# Patient Record
Sex: FEMALE | Race: WHITE | Employment: FULL TIME | URBAN - METROPOLITAN AREA
[De-identification: names, ages, dates, MRNs, and addresses within clinical notes are randomized per-mention and may not be internally consistent; named-entity substitution may affect disease eponyms.]

---

## 2018-11-02 ENCOUNTER — OFFICE VISIT (OUTPATIENT)
Dept: NEUROLOGY | Age: 53
End: 2018-11-02

## 2018-11-02 VITALS
DIASTOLIC BLOOD PRESSURE: 80 MMHG | HEART RATE: 60 BPM | BODY MASS INDEX: 22.82 KG/M2 | HEIGHT: 66 IN | SYSTOLIC BLOOD PRESSURE: 118 MMHG | OXYGEN SATURATION: 97 % | WEIGHT: 142 LBS

## 2018-11-02 DIAGNOSIS — R25.1 TREMOR: ICD-10-CM

## 2018-11-02 DIAGNOSIS — F41.9 ANXIETY: Primary | ICD-10-CM

## 2018-11-02 RX ORDER — MINERAL OIL
180 ENEMA (ML) RECTAL DAILY
COMMUNITY

## 2018-11-02 RX ORDER — LORAZEPAM 0.5 MG/1
TABLET ORAL
Qty: 20 TAB | Refills: 0 | Status: SHIPPED | OUTPATIENT
Start: 2018-11-02

## 2018-11-02 NOTE — PATIENT INSTRUCTIONS
Office Policies 
o Phone calls/patient messages: Please allow up to 24 hours for someone in the office to contact you about your call or message. Be mindful your provider may be out of the office or your message may require further review. We encourage you to use [employer] for your messages as this is a faster, more efficient way to communicate with our officeo Medication Refills: 
Prescription medications require up to 48 business hours to process. We encourage you to use [employer] for your refills. For controlled medications: Please allow up to 72 business hours to process. Certain medications may require you to  a written prescription at our office. NO narcotic/controlled medications will be prescribed after 4pm Monday through Friday or on weekendso Form/Paperwork Completion: 
Please note there is a $25 fee for all paperwork completed by our providers. We ask that you allow 7-14 business days. Pre-payment is due prior to picking up/faxing the completed form. You may also download your forms to [employer] to have your doctor print off. 
o Test Results: In order for a patient to obtain test results, an appointment must be made with the physician to review the results. Test results cannot be discussed over the phone. A Healthy Lifestyle: Care Instructions Your Care Instructions A healthy lifestyle can help you feel good, stay at a healthy weight, and have plenty of energy for both work and play. A healthy lifestyle is something you can share with your whole family. A healthy lifestyle also can lower your risk for serious health problems, such as high blood pressure, heart disease, and diabetes. You can follow a few steps listed below to improve your health and the health of your family. Follow-up care is a key part of your treatment and safety.  Be sure to make and go to all appointments, and call your doctor if you are having problems. It's also a good idea to know your test results and keep a list of the medicines you take. How can you care for yourself at home? · Do not eat too much sugar, fat, or fast foods. You can still have dessert and treats now and then. The goal is moderation. · Start small to improve your eating habits. Pay attention to portion sizes, drink less juice and soda pop, and eat more fruits and vegetables. ? Eat a healthy amount of food. A 3-ounce serving of meat, for example, is about the size of a deck of cards. Fill the rest of your plate with vegetables and whole grains. ? Limit the amount of soda and sports drinks you have every day. Drink more water when you are thirsty. ? Eat at least 5 servings of fruits and vegetables every day. It may seem like a lot, but it is not hard to reach this goal. A serving or helping is 1 piece of fruit, 1 cup of vegetables, or 2 cups of leafy, raw vegetables. Have an apple or some carrot sticks as an afternoon snack instead of a candy bar. Try to have fruits and/or vegetables at every meal. 
· Make exercise part of your daily routine. You may want to start with simple activities, such as walking, bicycling, or slow swimming. Try to be active 30 to 60 minutes every day. You do not need to do all 30 to 60 minutes all at once. For example, you can exercise 3 times a day for 10 or 20 minutes. Moderate exercise is safe for most people, but it is always a good idea to talk to your doctor before starting an exercise program. 
· Keep moving. Jereld Mcardle the lawn, work in the garden, or [employer]. Take the stairs instead of the elevator at work. · If you smoke, quit. People who smoke have an increased risk for heart attack, stroke, cancer, and other lung illnesses. Quitting is hard, but there are ways to boost your chance of quitting tobacco for good. ? Use nicotine gum, patches, or lozenges. ? Ask your doctor about stop-smoking programs and medicines. ? Keep trying. In addition to reducing your risk of diseases in the future, you will notice some benefits soon after you stop using tobacco. If you have shortness of breath or asthma symptoms, they will likely get better within a few weeks after you quit. · Limit how much alcohol you drink. Moderate amounts of alcohol (up to 2 drinks a day for men, 1 drink a day for women) are okay. But drinking too much can lead to liver problems, high blood pressure, and other health problems. Family health If you have a family, there are many things you can do together to improve your health. · Eat meals together as a family as often as possible. · Eat healthy foods. This includes fruits, vegetables, lean meats and dairy, and whole grains. · Include your family in your fitness plan. Most people think of activities such as jogging or tennis as the way to fitness, but there are many ways you and your family can be more active. Anything that makes you breathe hard and gets your heart pumping is exercise. Here are some tips: 
? Walk to do errands or to take your child to school or the bus. 
? Go for a family bike ride after dinner instead of watching TV. Where can you learn more? Go to http://real-arely.info/. Enter V076 in the search box to learn more about \"A Healthy Lifestyle: Care Instructions. \" Current as of: December 7, 2017 Content Version: 11.8 © 0468-0942 [employer]. Care instructions adapted under license by [employer] (which disclaims liability or warranty for this information). If you have questions about a medical condition or this instruction, always ask your healthcare professional. [place] [address] any warranty or liability for your use of this information.

## 2018-11-02 NOTE — PROGRESS NOTES
HISTORY OF PRESENT ILLNESS Maria Eugenia Barakat is a 45 y.o. female. This patient is a 68-year-old right-handed woman who comes in today for head and hand tremors. She states that she has noticed that her head will begin a side to side movement and she will get a similar tremor in her hands. It is more prominent when she is using her hands. Sitting and eating at a table with silverware often brings it out the most.  She states that it does not really interfere with what she does but it is rather embarrassing for her to have to put up with it. She has a long-standing anxiety disorder for which she was put on Paxil CR 25 mg 1 a day. She takes trazodone 25 mg nightly for sleep. She also has a history of renal disease is only moderately progressed. She has noticed no problems walking or keeping her balance. She has no family history of movement disorder or Parkinson's disease. Review of Systems Constitutional:  
     Review of systems is positive for anxiety, constipation, joint pain, memory loss, muscle weakness, tinnitus, occasional skipped beats and occasional blurred vision. Complete review of systems done all others negative Current Outpatient Medications on File Prior to Visit Medication Sig Dispense Refill  trazodone HCl (TRAZODONE PO) Take 25 mg by mouth.  fexofenadine (ALLEGRA) 180 mg tablet Take 180 mg by mouth daily.  levothyroxine (SYNTHROID) 50 mcg tablet Take  by mouth Daily (before breakfast).  PARoxetine CR (PAXIL CR) 25 mg tablet Take 25 mg by mouth daily.  pravastatin (PRAVACHOL) 20 mg tablet Take 20 mg by mouth nightly. No current facility-administered medications on file prior to visit. Past Medical History:  
Diagnosis Date  Anxiety disorder  Diabetes ([facility] [address] borderline  Ill-defined condition   
 elevated chol  Kidney disease  Thyroid disease Family History Problem Relation Age of Onset  Diabetes Father  Heart Disease Father Social History Tobacco Use  Smoking status: Never Smoker  Smokeless tobacco: Never Used Substance Use Topics  Alcohol use: Yes Alcohol/week: 0.6 oz Types: 1 Cans of beer per week  Drug use: No  
 
/80   Pulse 60   Ht 5' 6\" (1.676 m)   Wt 64.4 kg (142 lb)   SpO2 97%   BMI 22.92 kg/m² Physical Exam 
Constitutional: Oriented to person, place, and time, appears well-developed and well-nourished. No distress. HENT:  
Head: Normocephalic and atraumatic. Mouth/Throat: Oropharynx is clear and moist. No oropharyngeal exudate. Eyes: Conjunctivae and EOM are normal. Pupils are equal, round, and reactive to light. No scleral icterus. Neck: Normal range of motion. Neck supple. No thyromegaly present. Cardiovascular: Normal rate, regular rhythm and normal heart sounds. Musculoskeletal: Normal range of motion, exhibits no edema, tenderness or deformity. Lymphadenopathy: no cervical adenopathy. Neurological: Alert and oriented to person, place, and time. Normal strength and normal reflexes. Displays no atrophy,She has a fine 2-3 Hz tremor most prominent in her hands and her head. .  
No cranial nerve deficit or sensory deficit. Exhibits normal muscle tone. Displays a negative Romberg sign, no seizure activity. Coordination normal, gait normal.  
No Babinski's sign on the right side. No Babinski's sign on the left side. Speech, language and mentation are normal 
Visual fields are full to confrontation, funduscopic exam reveals flat discs, the retina and vasculature are normal  
Skin: Skin is warm and dry. No rash noted, not diaphoretic. No erythema. Psychiatric: Normal mood and affect,  behavior is normal. Judgment and thought content normal.  
Vitals reviewed. ASSESSMENT and PLAN 
ESSENTIAL TREMOR This patient is not interested in medication to take on a daily basis to cut down the tremor, she would rather on a as needed basis for social occasions. I will prescribe her some lorazepam 0.5 mg tablets she can take one half or 130 minutes prior to her engagement, this should cut down on the amount of tremor if she has for that short window of time. I explained to her that she cannot take these every day and I will not prescribe enough for her to take every day when they will rapidly lose their effectiveness and she will become anxious when she stops them. ANXIETY DISORDER Currently managed on trazodone 25 mg nightly. HYPERLIPIDEMIA Stroke risk, stable, managed by primary care. This note will not be viewable in [address]

## 2023-02-19 ENCOUNTER — HOSPITAL ENCOUNTER (EMERGENCY)
Age: 58
Discharge: HOME | End: 2023-02-19
Attending: STUDENT IN AN ORGANIZED HEALTH CARE EDUCATION/TRAINING PROGRAM
Payer: COMMERCIAL

## 2023-02-19 VITALS
OXYGEN SATURATION: 97 % | WEIGHT: 142 LBS | TEMPERATURE: 97.6 F | SYSTOLIC BLOOD PRESSURE: 107 MMHG | BODY MASS INDEX: 22.82 KG/M2 | HEART RATE: 61 BPM | HEIGHT: 66 IN | DIASTOLIC BLOOD PRESSURE: 50 MMHG | RESPIRATION RATE: 9 BRPM

## 2023-02-19 DIAGNOSIS — R55 SYNCOPE AND COLLAPSE: Primary | ICD-10-CM

## 2023-02-19 LAB
ALBUMIN SERPL-MCNC: 3.9 G/DL (ref 3.5–5)
ALBUMIN/GLOB SERPL: 1.1 (ref 1.1–2.2)
ALP SERPL-CCNC: 76 U/L (ref 45–117)
ALT SERPL-CCNC: 33 U/L (ref 12–78)
ANION GAP SERPL CALC-SCNC: 10 MMOL/L (ref 5–15)
AST SERPL-CCNC: 20 U/L (ref 15–37)
BASOPHILS # BLD: 0 K/UL (ref 0–0.1)
BASOPHILS NFR BLD: 1 % (ref 0–1)
BILIRUB SERPL-MCNC: 0.4 MG/DL (ref 0.2–1)
BUN SERPL-MCNC: 25 MG/DL (ref 6–20)
BUN/CREAT SERPL: 20 (ref 12–20)
CALCIUM SERPL-MCNC: 9 MG/DL (ref 8.5–10.1)
CHLORIDE SERPL-SCNC: 100 MMOL/L (ref 97–108)
CO2 SERPL-SCNC: 28 MMOL/L (ref 21–32)
COMMENT, HOLDF: NORMAL
CREAT SERPL-MCNC: 1.26 MG/DL (ref 0.55–1.02)
D DIMER PPP FEU-MCNC: <0.19 MG/L FEU (ref 0–0.65)
DIFFERENTIAL METHOD BLD: NORMAL
EOSINOPHIL # BLD: 0.1 K/UL (ref 0–0.4)
EOSINOPHIL NFR BLD: 3 % (ref 0–7)
ERYTHROCYTE [DISTWIDTH] IN BLOOD BY AUTOMATED COUNT: 12.8 % (ref 11.5–14.5)
GLOBULIN SER CALC-MCNC: 3.6 G/DL (ref 2–4)
GLUCOSE SERPL-MCNC: 125 MG/DL (ref 65–100)
HCT VFR BLD AUTO: 41.8 % (ref 35–47)
HGB BLD-MCNC: 13.9 G/DL (ref 11.5–16)
IMM GRANULOCYTES # BLD AUTO: 0 K/UL (ref 0–0.04)
IMM GRANULOCYTES NFR BLD AUTO: 0 % (ref 0–0.5)
LYMPHOCYTES # BLD: 2.2 K/UL (ref 0.8–3.5)
LYMPHOCYTES NFR BLD: 38 % (ref 12–49)
MCH RBC QN AUTO: 29 PG (ref 26–34)
MCHC RBC AUTO-ENTMCNC: 33.3 G/DL (ref 30–36.5)
MCV RBC AUTO: 87.1 FL (ref 80–99)
MONOCYTES # BLD: 0.3 K/UL (ref 0–1)
MONOCYTES NFR BLD: 6 % (ref 5–13)
NEUTS SEG # BLD: 2.9 K/UL (ref 1.8–8)
NEUTS SEG NFR BLD: 52 % (ref 32–75)
NRBC # BLD: 0 K/UL (ref 0–0.01)
NRBC BLD-RTO: 0 PER 100 WBC
PLATELET # BLD AUTO: 208 K/UL (ref 150–400)
PMV BLD AUTO: 9.3 FL (ref 8.9–12.9)
POTASSIUM SERPL-SCNC: 3.8 MMOL/L (ref 3.5–5.1)
PROT SERPL-MCNC: 7.5 G/DL (ref 6.4–8.2)
RBC # BLD AUTO: 4.8 M/UL (ref 3.8–5.2)
SAMPLES BEING HELD,HOLD: NORMAL
SODIUM SERPL-SCNC: 138 MMOL/L (ref 136–145)
TROPONIN I SERPL HS-MCNC: 21 NG/L (ref 0–51)
TROPONIN I SERPL HS-MCNC: 22 NG/L (ref 0–51)
WBC # BLD AUTO: 5.6 K/UL (ref 3.6–11)

## 2023-02-19 PROCEDURE — 36415 COLL VENOUS BLD VENIPUNCTURE: CPT

## 2023-02-19 PROCEDURE — 80053 COMPREHEN METABOLIC PANEL: CPT

## 2023-02-19 PROCEDURE — 99284 EMERGENCY DEPT VISIT MOD MDM: CPT

## 2023-02-19 PROCEDURE — 84484 ASSAY OF TROPONIN QUANT: CPT

## 2023-02-19 PROCEDURE — 85379 FIBRIN DEGRADATION QUANT: CPT

## 2023-02-19 PROCEDURE — 85025 COMPLETE CBC W/AUTO DIFF WBC: CPT

## 2023-02-19 PROCEDURE — 74011250636 HC RX REV CODE- 250/636: Performed by: STUDENT IN AN ORGANIZED HEALTH CARE EDUCATION/TRAINING PROGRAM

## 2023-02-19 PROCEDURE — 96360 HYDRATION IV INFUSION INIT: CPT

## 2023-02-19 PROCEDURE — 93005 ELECTROCARDIOGRAM TRACING: CPT

## 2023-02-19 RX ORDER — ONDANSETRON 2 MG/ML
4 INJECTION INTRAMUSCULAR; INTRAVENOUS
Status: DISCONTINUED | OUTPATIENT
Start: 2023-02-19 | End: 2023-02-19 | Stop reason: HOSPADM

## 2023-02-19 RX ADMIN — SODIUM CHLORIDE 1000 ML: 9 INJECTION, SOLUTION INTRAVENOUS at 13:31

## 2023-02-19 NOTE — Clinical Note
[address]  [facility] EMERGENCY DEPT  [address] [facility] [address]  [phone]    Work/School Note    Date: 2/19/2023    To Whom It May concern:    Murphy Wade was seen and treated today in the emergency room by the following provider(s):  Attending Provider: Yves Chapman MD.      Murphy Wade is excused from work/school on 02/19/23 and 02/20/23. She is medically clear to return to work/school on 2/21/2023.        Sincerely,          Brooklynn North MD

## 2023-02-19 NOTE — DISCHARGE INSTRUCTIONS
You were seen and evaluated today in the ER after passing out episode, this is called syncope, likely from low blood pressure and dehydration, if you continue to have symptoms or chest pain or shortness of breath please return to ER otherwise you can follow-up with cardiology this Thursday at your regular scheduled appointment.

## 2023-02-19 NOTE — ED PROVIDER NOTES
[facility] EMERGENCY DEPT  EMERGENCY DEPARTMENT ENCOUNTER       Pt Name: Liat Dobbins  MRN: [id number]  [place] 1965  Date of evaluation: 2/19/2023  Provider: Sivan Markham MD   PCP: Rachael Zuñiga MD  Note Started: 2:00 PM 2/19/23     CHIEF COMPLAINT       Chief Complaint   Patient presents with    Syncope        HISTORY OF PRESENT ILLNESS: 1 or more elements    History From: Patient  HPI Limitations : None     Liat Dobbins is a 59 y.o. female with Pmhx listed below     Patient is a 47-year-old female with history of thyroid disease, CKD, diabetes presenting with concern of syncopal episode which occurred today while eating. Patient states that she was eating lunch, states that she felt nauseous sick to her stomach and went out to her car, began feeling dizzy and then reportedly passed out, notified by bystanders, states that she had shaking all over but no bowel or bladder incontinence or tongue biting, states that she came back in seconds and states that she was not confused afterwards, denies any history of the same except for many years ago with a similar occurrence, states that she has had no recent vomiting, diarrhea. Patient states that since the event she has had no more lightheadedness or nausea, states that she otherwise has been in her usual state of health, no cardiac history but does state that she has a appointment upcoming with cardiology this Thursday, positive family history of coagulopathy but no personal, no leg swelling, no other complaints this time. Nursing Notes were all reviewed and agreed with or any disagreements were addressed in the HPI. REVIEW OF SYSTEMS      Positives and Pertinent negatives as per HPI.     PAST HISTORY     Past Medical History:  Past Medical History:   Diagnosis Date    Anxiety disorder     Diabetes ([facility] [address]     borderline    Ill-defined condition     elevated chol    Kidney disease     Thyroid disease      Previous Medications    FEXOFENADINE (ALLEGRA) 180 MG TABLET    Take 180 mg by mouth daily. LEVOTHYROXINE (SYNTHROID) 50 MCG TABLET    Take  by mouth Daily (before breakfast). LORAZEPAM (ATIVAN) 0.5 MG TABLET    1/2 or 1 po q 4 hours PRN tremor    PAROXETINE CR (PAXIL CR) 25 MG TABLET    Take 25 mg by mouth daily. PRAVASTATIN (PRAVACHOL) 20 MG TABLET    Take 20 mg by mouth nightly. TRAZODONE HCL (TRAZODONE PO)    Take 25 mg by mouth. Past Surgical History:  Past Surgical History:   Procedure Laterality Date    COLONOSCOPY,DIAGNOSTIC  4/11/2016         HX OTHER SURGICAL      cholecystectomy       Family History:  Family History   Problem Relation Age of Onset    Diabetes Father     Heart Disease Father        Social History:  Social History     Tobacco Use    Smoking status: Never    Smokeless tobacco: Never   Substance Use Topics    Alcohol use: Yes     Alcohol/week: 1.0 standard drink     Types: 1 Cans of beer per week    Drug use: No       Allergies: Allergies   Allergen Reactions    Cephalexin Hives    Erythromycin Nausea Only    Macrobid [Nitrofurantoin Monohyd/M-Cryst] Hives    Penicillins Hives    Sulfa (Sulfonamide Antibiotics) Hives    Vancomycin Hives       PHYSICAL EXAM      ED Triage Vitals [02/19/23 1156]   ED Encounter Vitals Group      BP (!) 103/54      Pulse (Heart Rate) 68      Resp Rate 18      Temp 97.6 °F (36.4 °C)      Temp src       O2 Sat (%) 97 %      Weight 142 lb      Height 5' 6\"        Physical Exam  Vitals reviewed. Constitutional:       General: She is not in acute distress. Appearance: Normal appearance. She is not ill-appearing, toxic-appearing or diaphoretic. HENT:      Head: Normocephalic. Mouth/Throat:      Mouth: Mucous membranes are moist.   Eyes:      Conjunctiva/sclera: Conjunctivae normal.   Cardiovascular:      Rate and Rhythm: Normal rate. Pulmonary:      Effort: Pulmonary effort is normal. No respiratory distress. Abdominal:      General: Abdomen is flat.    Musculoskeletal: General: No deformity. Cervical back: Neck supple. Right lower leg: No edema. Left lower leg: No edema. Skin:     General: Skin is warm and dry. Neurological:      General: No focal deficit present. Mental Status: She is alert.    Psychiatric:         Mood and Affect: Mood normal.        EMERGENCY DEPARTMENT COURSE and DIFFERENTIAL DIAGNOSIS/MDM   CC/HPI Summary, DDx, ED Course, and Reassessment:     MDM  Number of Diagnoses or Management Options  Syncope and collapse  Diagnosis management comments: Patient well-appearing 61-year-old female presenting with concern of syncopal episode which occurred earlier today, states that she felt nauseous, had 1 episode of vomiting and then felt dizzy afterwards, patient states that she had episode of shaking all over per bystanders but then returned to baseline quickly and had resolution of symptoms afterwards, patient denies any recent complaints, no recent illnesses, nausea vomiting or diarrhea, no chest pain or shortness of breath prior to the event, no swelling, no personal history of DVT or PE but does have family history of coagulopathy, due to this we will plan on D-dimer for low risk PE rule out, will also plan on troponin x2 to evaluate for ACS, EKG is normal sinus rhythm on arrival, patient with mildly low blood pressure with systolics in the 466, will provide patient with small bolus of fluids for possible orthostatic hypotension as cause of symptoms, higher suspicion for vasovagal or postural orthostatic hypotension as cause, lower suspicion for cardiac cause but will obtain lab work including CBC to check for anemia, CMP to check for electrolyte abnormality or RUFUS and reevaluate once lab work complete for final dispo         ED Course as of 02/19/23 1613   Sun Feb 19, 2023   1548 Patient lab work unremarkable other than possible mild RUFUS although patient states that she has baseline renal dysfunction, no signs of anemia, troponin negative x2, D-dimer negative, chest x-ray without any significant findings, patient is well appearing, orthostatics within normal limits, has had no recurrent symptoms, concern for possible orthostatic hypotension with mild dehydration, will have patient follow-up with cardiologist, states she already has appointment on Thursday, given her strict return precautions to return to ER if symptoms continue, otherwise she is stable for discharge at this time.  [RN]      ED Course User Index  [RN] Evi Ribeiro MD       Vitals:    02/19/23 1348 02/19/23 1444 02/19/23 1445 02/19/23 1446   BP: 120/64 (!) 104/59 110/65 113/67   Pulse: 63 63 73 72   Resp: 10 13 16 20   Temp:       SpO2: 98% 96% 96% 97%   Weight:       Height:            Patient was given the following medications:  Medications   ondansetron (ZOFRAN) injection 4 mg (4 mg IntraVENous Refused 2/19/23 1317)   sodium chloride 0.9 % bolus infusion 1,000 mL (1,000 mL IntraVENous New Bag 2/19/23 1331)       CONSULTS:  None    Social Determinants affecting Dx or Tx: None    Records Reviewed (source and summary of external notes): None  DIAGNOSTIC RESULTS   LABS:     Recent Results (from the past 12 hour(s))   EKG, 12 LEAD, INITIAL    Collection Time: 02/19/23 11:48 AM   Result Value Ref Range    Ventricular Rate 61 BPM    Atrial Rate 61 BPM    P-R Interval 144 ms    QRS Duration 92 ms    Q-T Interval 442 ms    QTC Calculation (Bezet) 444 ms    Calculated P Axis 43 degrees    Calculated R Axis 59 degrees    Calculated T Axis 42 degrees    Diagnosis       Normal sinus rhythm  Incomplete right bundle branch block  No previous ECGs available     CBC WITH AUTOMATED DIFF    Collection Time: 02/19/23 12:03 PM   Result Value Ref Range    WBC 5.6 3.6 - 11.0 K/uL    RBC 4.80 3.80 - 5.20 M/uL    HGB 13.9 11.5 - 16.0 g/dL    HCT 41.8 35.0 - 47.0 %    MCV 87.1 80.0 - 99.0 FL    MCH 29.0 26.0 - 34.0 PG    MCHC 33.3 30.0 - 36.5 g/dL    RDW 12.8 11.5 - 14.5 %    PLATELET 226 025 - 400 K/uL    MPV 9.3 8.9 - 12.9 FL    NRBC 0.0 0  WBC    ABSOLUTE NRBC 0.00 0.00 - 0.01 K/uL    NEUTROPHILS 52 32 - 75 %    LYMPHOCYTES 38 12 - 49 %    MONOCYTES 6 5 - 13 %    EOSINOPHILS 3 0 - 7 %    BASOPHILS 1 0 - 1 %    IMMATURE GRANULOCYTES 0 0.0 - 0.5 %    ABS. NEUTROPHILS 2.9 1.8 - 8.0 K/UL    ABS. LYMPHOCYTES 2.2 0.8 - 3.5 K/UL    ABS. MONOCYTES 0.3 0.0 - 1.0 K/UL    ABS. EOSINOPHILS 0.1 0.0 - 0.4 K/UL    ABS. BASOPHILS 0.0 0.0 - 0.1 K/UL    ABS. IMM. GRANS. 0.0 0.00 - 0.04 K/UL    DF AUTOMATED     METABOLIC PANEL, COMPREHENSIVE    Collection Time: 02/19/23 12:03 PM   Result Value Ref Range    Sodium 138 136 - 145 mmol/L    Potassium 3.8 3.5 - 5.1 mmol/L    Chloride 100 97 - 108 mmol/L    CO2 28 21 - 32 mmol/L    Anion gap 10 5 - 15 mmol/L    Glucose 125 (H) 65 - 100 mg/dL    BUN 25 (H) 6 - 20 MG/DL    Creatinine 1.26 (H) 0.55 - 1.02 MG/DL    BUN/Creatinine ratio 20 12 - 20      eGFR 49 (L) >60 ml/min/1.73m2    Calcium 9.0 8.5 - 10.1 MG/DL    Bilirubin, total 0.4 0.2 - 1.0 MG/DL    ALT (SGPT) 33 12 - 78 U/L    AST (SGOT) 20 15 - 37 U/L    Alk. phosphatase 76 45 - 117 U/L    Protein, total 7.5 6.4 - 8.2 g/dL    Albumin 3.9 3.5 - 5.0 g/dL    Globulin 3.6 2.0 - 4.0 g/dL    A-G Ratio 1.1 1.1 - 2.2     TROPONIN-HIGH SENSITIVITY    Collection Time: 02/19/23 12:03 PM   Result Value Ref Range    Troponin-High Sensitivity 21 0 - 51 ng/L   D DIMER    Collection Time: 02/19/23  1:28 PM   Result Value Ref Range    D-dimer <0.19 0.00 - 0.65 mg/L FEU   SAMPLES BEING HELD    Collection Time: 02/19/23  1:28 PM   Result Value Ref Range    SAMPLES BEING HELD PST     COMMENT        Add-on orders for these samples will be processed based on acceptable specimen integrity and analyte stability, which may vary by analyte.    TROPONIN-HIGH SENSITIVITY    Collection Time: 02/19/23  3:11 PM   Result Value Ref Range    Troponin-High Sensitivity 22 0 - 51 ng/L        EKG: Normal sinus rhythm, regular rate 61, no ST changes RADIOLOGY:  Non-plain film images such as CT, Ultrasound and MRI are read by the radiologist.   Plain radiographic images are often visualized and preliminarily interpreted by the ED, if an interpretation was completed the findings will be listed below:        Interpretation per the Radiologist below, if available at the time of this note:     No results found. PROCEDURES   Unless otherwise noted below, none  Procedures     CRITICAL CARE TIME       FINAL IMPRESSION     1. Syncope and collapse          DISPOSITION/PLAN   Discharged    Discharge Note: The patient is stable for discharge home. The signs, symptoms, diagnosis, and discharge instructions have been discussed, understanding conveyed, and agreed upon. The patient is to follow up as recommended or return to ER should their symptoms worsen. PATIENT REFERRED TO:  Follow-up Information       Follow up With Specialties Details Why Contact Info    Radha Flowers MD Family Medicine  If symptoms worsen [address] [facility]  [phone]      [facility] EMERGENCY DEPT Emergency Medicine   [address] [place] [address]  [phone]              DISCHARGE MEDICATIONS:  Current Discharge Medication List            DISCONTINUED MEDICATIONS:  Current Discharge Medication List          I am the Primary Clinician of Record. Signed By: Mayuri Pendleton MD     February 19, 2023      (Please note that parts of this dictation were completed with voice recognition software. Quite often unanticipated grammatical, syntax, homophones, and other interpretive errors are inadvertently transcribed by the computer software. Please disregards these errors.  Please excuse any errors that have escaped final proofreading.)

## 2023-02-19 NOTE — ED TRIAGE NOTES
Pt arrives via EMS , Pt was eating lunch and then felt sick to her stomach, she went out to her car and began feeling dizziness and nausea. Pt states she passed out, unsure how long but when she came to EMS was standing around her. Pt states when she picks up her head she feels dizzy and nauseated. Denies any cardiac history or hx of vertigo.

## 2023-02-20 LAB
ATRIAL RATE: 61 BPM
CALCULATED P AXIS, ECG09: 43 DEGREES
CALCULATED R AXIS, ECG10: 59 DEGREES
CALCULATED T AXIS, ECG11: 42 DEGREES
DIAGNOSIS, 93000: NORMAL
P-R INTERVAL, ECG05: 144 MS
Q-T INTERVAL, ECG07: 442 MS
QRS DURATION, ECG06: 92 MS
QTC CALCULATION (BEZET), ECG08: 444 MS
VENTRICULAR RATE, ECG03: 61 BPM

## 2023-02-28 ENCOUNTER — OFFICE VISIT (OUTPATIENT)
Dept: NEUROLOGY | Age: 58
End: 2023-02-28
Payer: COMMERCIAL

## 2023-02-28 VITALS
DIASTOLIC BLOOD PRESSURE: 78 MMHG | OXYGEN SATURATION: 98 % | BODY MASS INDEX: 22.5 KG/M2 | HEIGHT: 66 IN | SYSTOLIC BLOOD PRESSURE: 122 MMHG | HEART RATE: 77 BPM | WEIGHT: 140 LBS

## 2023-02-28 DIAGNOSIS — R55 SYNCOPE AND COLLAPSE: ICD-10-CM

## 2023-02-28 DIAGNOSIS — G25.0 BENIGN ESSENTIAL TREMOR: Primary | ICD-10-CM

## 2023-02-28 PROCEDURE — 99205 OFFICE O/P NEW HI 60 MIN: CPT | Performed by: PSYCHIATRY & NEUROLOGY

## 2023-02-28 RX ORDER — PRIMIDONE 50 MG/1
TABLET ORAL
Qty: 90 TABLET | Refills: 0 | Status: SHIPPED | OUTPATIENT
Start: 2023-02-28

## 2023-02-28 NOTE — PROGRESS NOTES
NEUROLOGY  NEW PATIENT EVALUATION/CONSULTATION       PATIENT NAME: Rama Amos    MRN: [id number]    REASON FOR CONSULTATION: Tremor    02/28/23      Previous records (physician notes, laboratory reports, and radiology reports) and imaging studies were reviewed and summarized. My recommendations will be communicated back to the patient's physician(s) via electronic medical record and/or by [address] mail. HISTORY OF PRESENT ILLNESS:  Rama Amos is a 59 y.o.  female presenting for evaluation of tremors. She reports onset of tremor since college with some minor progression since onset. Motor:   Tremor-b/l hands and head tremors, worse with anxiety or activity. She has tried lorazepam in the past for her tremors but this caused excessive fatigue. Walking/Falls- no falls  Micrographia- no  Freezing/Bradykinesia-none  Balance- no imbalance  Non-motor:   Drooling- no  Dysphagia- intermittent  Hypophonia- mild, h/o vocal cord paralysis  Anosmia- denies  Autonomic:  Urinary- no issues  Constipation- yes  Orthostatic hypotension- yes  Sleep- insomnia  Cognitive/Memory- no issues  Behavioral/Psychiatric:   Hallucinations- none  Depression- none    The patient denies a history of exposure to neuroleptics, (Reglan, Phenergan, Compazine, no encephalitis/meningitis, no significant exposure to insecticides/ pesticides/ heavy metals/ carbon monoxide. +FHx hand tremor/ET in her mother/brother. +H/O thyroid disorder-recent labs within normal range per patient.     PAST MEDICAL HISTORY:  Past Medical History:   Diagnosis Date    Anxiety disorder     Diabetes ([facility] [address]     borderline    Ill-defined condition     elevated chol    Kidney disease     Thyroid disease        PAST SURGICAL HISTORY:  Past Surgical History:   Procedure Laterality Date    COLONOSCOPY,DIAGNOSTIC  4/11/2016         HX OTHER SURGICAL      cholecystectomy       FAMILY HISTORY:   Family History   Problem Relation Age of Onset    Diabetes Father     Heart Disease Father          SOCIAL HISTORY:  Social History     Socioeconomic History    Marital status:    Tobacco Use    Smoking status: Never    Smokeless tobacco: Never   Substance and Sexual Activity    Alcohol use: Yes     Alcohol/week: 1.0 standard drink     Types: 1 Cans of beer per week    Drug use: No         MEDICATIONS:   Current Outpatient Medications   Medication Sig Dispense Refill    trazodone HCl (TRAZODONE PO) Take 25 mg by mouth. fexofenadine (ALLEGRA) 180 mg tablet Take 180 mg by mouth daily. levothyroxine (SYNTHROID) 50 mcg tablet Take  by mouth Daily (before breakfast). PARoxetine CR (PAXIL-CR) 25 mg tablet Take 25 mg by mouth daily. ALLERGIES:  Allergies   Allergen Reactions    Cephalexin Hives    Erythromycin Nausea Only    Macrobid [Nitrofurantoin Monohyd/M-Cryst] Hives    Penicillins Hives    Sulfa (Sulfonamide Antibiotics) Hives    Vancomycin Hives         REVIEW OF SYSTEMS:  10 point ROS reviewed with patient. Please see scanned document under media.   +Postural dizziness/syncope (<1 min) x 2 over last 2 months with associated nausea, mild shaking b/l UE, no incontinence, no prolonged confusion/fatigue    PHYSICAL EXAM:  Vital Signs: Visit Vitals  /78   Pulse 77   Ht 5' 6\" (1.676 m)   Wt 140 lb (63.5 kg)   SpO2 98%   BMI 22.60 kg/m²        General Medical Exam:  General:  Well appearing, comfortable, in no apparent distress. Eyes/ENT: see cranial nerve examination. Neck: No masses appreciated. Full range of motion without tenderness. Respiratory:  Clear to auscultation, good air entry bilaterally. Cardiac:  Regular rate and rhythm, no murmur. GI:  Soft, non-tender, non-distended abdomen. Bowel sounds normal. No masses, organomegaly. Extremities:  No deformities, edema, or skin discoloration. Skin:  No rashes or lesions. Neurological:  Mental Status:  Alert and oriented to person, place, and time with fluent speech.    Cranial Nerves:   CNII/III/IV/VI: visual fields full to confrontation, EOMI, PERRL, no ptosis or nystagmus. CN V: Facial sensation intact bilaterally, masseter 5/5   CN VII: Facial muscles symmetric and strong   CN VIII: Hears finger rub well bilaterally, intact vestibular function   CN IX/X: Normal palatal movement   CN XI: Full strength shoulder shrug bilaterally   CN XII: Tongue protrusion full and midline without fasciculation or atrophy  Motor: Normal tone and muscle bulk with no pronator drift. No atrophy or fasciculations present on examination. Individual muscle group testing:  Shoulder abduction:   Left:5/5   Right : 5/5    Shoulder adduction:   Left:5/5   Right : 5/5    Elbow flexion:      Left:5/5   Right : 5/5  Elbow extension:    Left:5/5   Right : 5/5   Wrist flexion:    Left:5/5   Right : 5/5  Wrist extension:    Left:5/5   Right : 5/5  Arm pronation:   Left:5/5   Right : 5/5  Arm supination:   Left:5/5   Right : 5/5    Finger flexion:    Left:5/5   Right : 5/5    Finger extension:   Left:5/5   Right : 5/5   Finger abduction:  Left:5/5   Right : 5/5   Finger adduction:   Left:5/5   Right : 5/5  Hip flexion:     Left:5/5   Right : 5/5         Hip extension:   Left:5/5   Right : 5/5    Knee flexion:    Left:5/5   Right : 5/5    Knee extension:   Left:5/5   Right : 5/5    Dorsiflexion:     Left:5/5   Right : 5/5  Plantar flexion:    Left:5/5   Right : 5/5    Foot inversion:    Left:5/5   Right : 5/5   Foot eversion:    Left:5/5   Right : 5/5  Toe flexion:      Left:5/5   Right : 5/5          Toe extension:    Left:5/5   Right : 5/5    MSRs: No crossed adductors or clonus. RIGHT  LEFT   Brachioradialis 2+ 2+   Biceps 2+ 2+   Triceps 2+ 2+   Knee 2+ 2+   Achilles 1+ 1+        Plantar response Downward Downward          Sensation: Normal and symmetric perception of pinprick, temperature, light touch, proprioception, and vibration; (-) Romberg. Coordination: No dysmetria.  Normal rapid alternating movements; finger-to-nose and heel-to- shin testing are within normal limits. No bradykinesia, rigidity. +Head titubation, +Moderate action tremor b/l UE on FTN. Gait: Normal native gait. PERTINENT DATA:  INTERNAL RECORDS:  The patient's electronic medical record was reviewed. The relevant details include:    MRI Results (maximum last 3): Results from [place] encounter on 06/22/16    MRI IAC W WO CONT    Narrative  **Final Report**      ICD Codes / Adm. Diagnosis: 782.3  386.00 / Edema  Meniere's disease,  unspecified  Examination:  MR Renzo Brewster CON  - [id number] - Jun 22 2016  9:36AM  Accession No:  [id number]  Reason:  Hydrops, Meniere disease, Sudden hearing loss, right      REPORT:  INDICATION:   Hydrops, Meniere disease, Sudden hearing loss, right    EXAMINATION:  MR BRAIN WITH/WITHOUT CONTRAST, IAC PROTOCOL    COMPARISON:  None    TECHNIQUE:  MR imaging of the brain was performed with sagittal T1, axial  T1, T2, FLAIR, DWI/ADC, axial FIESTA, thin slice pre and post contrast T1  through the internal auditory canals using 7.5 mL Gadavist.    FINDINGS:    The ventricles are midline without hydrocephalus. There is no acute intra  or extra-axial fluid collection. There is no significant white matter  disease. There is no acute infarction. The major intracranial vascular  flow-voids are patent. Evaluation of the cranial nerves is unremarkable. There is normal T2 hyperintensity without abnormal enhancement in the inner  ear structures bilaterally. There is no abnormal parenchymal or meningeal  enhancement. Impression  :  Normal MR evaluation of the internal auditory canals. Signing/Reading Doctor: Kody Malik ([id number])  Approved: Kody Malik ([id number])  Jun 22 2016 10:47AM        ASSESSMENT:      ICD-10-CM ICD-9-CM    1. Benign essential tremor  G25.0 333.1 MRI BRAIN WO CONT      2.  Syncope and collapse  R55 780.2 MRI BRAIN WO CONT      EEG      59year old female presenting for evaluation of b/l hand and head tremors for several decades most consistent with benign essential tremor. Family history is also notable for similar tremor in her mother/siblings. She was reassured there is no current evidence to support an evolving neurodegenerative process such as Parkinson's disease. We discussed options for treatment of her essential tremor. She is mainly interested in treatment during certain social engagements for her work. She is not an ideal candidate for betablocker therapy due to recent postural dizziness/syncope. Therefore, will proceed with a slow titration of primidone to assist with ET. She was advised of potential for sedation with medication and will call if any noted side effects. Lastly, will proceed with updated neuroimaging and baseline EEG monitoring to exclude any acute intracranial pathology or epileptiform activity due to recent dizziness/syncopal episodes, although discussed lower suspicion for seizure activity based on description of events. She is also scheduled to see Cardiology for further evaluation. PLAN:  MRI Brain WO  Routine EEG  Start Primidone 25mg qhs x 2 weeks, then increase to 25mg BID      Follow-up and Dispositions    Return in about 3 months (around 5/28/2023). I have discussed the diagnosis with the patient today and the intended plan as seen in the above orders with both the patient as well as referring provider and/or PCP via electronic correspondence. The patient has received an after-visit summary and questions were answered concerning future plans. I have discussed medication side effects and warnings with the patient as well. Katie Hurd DO  Staff Neurologist  Diplomate, [address] [place] [address] Board of Psychiatry & Neurology       CC Referring provider:    Taj Ellison MD

## 2023-03-17 ENCOUNTER — HOSPITAL ENCOUNTER (OUTPATIENT)
Dept: MRI IMAGING | Age: 58
Discharge: HOME | End: 2023-03-17
Attending: PSYCHIATRY & NEUROLOGY
Payer: COMMERCIAL

## 2023-03-17 ENCOUNTER — TELEPHONE (OUTPATIENT)
Dept: NEUROLOGY | Age: 58
End: 2023-03-17

## 2023-03-17 ENCOUNTER — HOSPITAL ENCOUNTER (OUTPATIENT)
Dept: NEUROLOGY | Age: 58
End: 2023-03-17
Attending: PSYCHIATRY & NEUROLOGY
Payer: COMMERCIAL

## 2023-03-17 DIAGNOSIS — G25.0 BENIGN ESSENTIAL TREMOR: ICD-10-CM

## 2023-03-17 DIAGNOSIS — R55 SYNCOPE AND COLLAPSE: ICD-10-CM

## 2023-03-17 PROCEDURE — 70551 MRI BRAIN STEM W/O DYE: CPT

## 2023-03-17 PROCEDURE — 95816 EEG AWAKE AND DROWSY: CPT

## 2023-03-17 PROCEDURE — 95816 EEG AWAKE AND DROWSY: CPT | Performed by: PSYCHIATRY & NEUROLOGY

## 2023-03-17 NOTE — PROCEDURES
EEG REPORT    Patient Name: Nanette Thornton  : 1965  Age: 59 y.o. Ordering physician: Henna Robert  Date of EEG: 3/17/2023   8:14 - 8:34  Diagnosis: abnormal movements  Interpreting physician: Suzanne Hughes D.O. FAAN    Procedure: EEG    CLINICAL INDICATION: The patient is a 59 y.o. female who is being evaluated for baseline electro cerebral activities and to rule out seizure focus. Current Outpatient Medications   Medication Sig    primidone (MYSOLINE) 50 mg tablet Start Primidone 25mg qhs x 2 weeks, then increase to 25mg BID    trazodone HCl (TRAZODONE PO) Take 25 mg by mouth. fexofenadine (ALLEGRA) 180 mg tablet Take 180 mg by mouth daily. levothyroxine (SYNTHROID) 50 mcg tablet Take  by mouth Daily (before breakfast). PARoxetine CR (PAXIL-CR) 25 mg tablet Take 25 mg by mouth daily. No current facility-administered medications for this encounter. DESCRIPTION OF PROCEDURE:     This is a digitally recorded electroencephalogram  Electrodes were applied in accordance with the international 10-20 system of electrode placement. 18 channels of scalp EEG are recorded  A channel was used for EoG  Another channel was used for ECG   The data is stored digitally and reviewed in reformatted montages for optimal display  EEG  was reviewed in both bipolar and referential montages    Description of Activity: The background of this recording contains a posteriorly-located occipital alpha rhythm of 9-10 hz that attenuates with eye opening. This was seen maximally over the posterior head region and was symmetric. Throughout the recording, there were no clear areas of focal slowing nor spike or spike-and-wave discharges seen. Hyperventilation was not performed. Photic stimulation produced minimal driving response in the posterior head regions in the mid frequency ranges.      During the recording, the patient did not achieve stage II sleep        Clinical Interpretation: This EEG, performed during wakefulness, is normal.  There was no clear focal abnormalities or epileptiform activity. A normal EEG doesn't not rule out seizures. Clinical correlation recommended. OLE Joe

## 2023-03-17 NOTE — TELEPHONE ENCOUNTER
Called and spoke to the Pt. Per Dr. Gabriela Silva:  MRI Brain does not reveal evidence of any acute pathology.  Noted incidental bilateral maxillary sinus mucosal thickening/mucous retention cysts- may follow up with PCP regarding these findings  Faxed results to PCP received confirmation it was sent successfully

## 2023-07-12 ENCOUNTER — OFFICE VISIT (OUTPATIENT)
Age: 58
End: 2023-07-12
Payer: COMMERCIAL

## 2023-07-12 VITALS
HEIGHT: 66 IN | SYSTOLIC BLOOD PRESSURE: 128 MMHG | WEIGHT: 147 LBS | HEART RATE: 68 BPM | DIASTOLIC BLOOD PRESSURE: 76 MMHG | BODY MASS INDEX: 23.63 KG/M2 | OXYGEN SATURATION: 97 %

## 2023-07-12 DIAGNOSIS — R55 SYNCOPE AND COLLAPSE: ICD-10-CM

## 2023-07-12 DIAGNOSIS — G25.0 ESSENTIAL TREMOR: Primary | ICD-10-CM

## 2023-07-12 PROCEDURE — 99214 OFFICE O/P EST MOD 30 MIN: CPT | Performed by: PSYCHIATRY & NEUROLOGY

## 2023-07-12 RX ORDER — GABAPENTIN 100 MG/1
100 CAPSULE ORAL PRN
Qty: 30 CAPSULE | Refills: 0 | Status: SHIPPED | OUTPATIENT
Start: 2023-07-12 | End: 2023-08-11

## 2023-12-19 ENCOUNTER — TELEPHONE (OUTPATIENT)
Age: 58
End: 2023-12-19

## 2023-12-19 NOTE — TELEPHONE ENCOUNTER
Called the patient to give her a sooner appointment instead of 01.16.2024 and she stated that she doesn't need it any more she feels better and she doesn't need her medication.